# Patient Record
Sex: MALE | Race: WHITE | NOT HISPANIC OR LATINO | Employment: PART TIME | URBAN - METROPOLITAN AREA
[De-identification: names, ages, dates, MRNs, and addresses within clinical notes are randomized per-mention and may not be internally consistent; named-entity substitution may affect disease eponyms.]

---

## 2021-06-10 ENCOUNTER — OFFICE VISIT (OUTPATIENT)
Dept: URGENT CARE | Facility: CLINIC | Age: 21
End: 2021-06-10
Payer: COMMERCIAL

## 2021-06-10 ENCOUNTER — APPOINTMENT (OUTPATIENT)
Dept: RADIOLOGY | Facility: IMAGING CENTER | Age: 21
End: 2021-06-10
Attending: NURSE PRACTITIONER
Payer: COMMERCIAL

## 2021-06-10 VITALS
SYSTOLIC BLOOD PRESSURE: 102 MMHG | HEIGHT: 66 IN | DIASTOLIC BLOOD PRESSURE: 70 MMHG | OXYGEN SATURATION: 95 % | BODY MASS INDEX: 23.75 KG/M2 | HEART RATE: 68 BPM | TEMPERATURE: 99.2 F | RESPIRATION RATE: 16 BRPM | WEIGHT: 147.8 LBS

## 2021-06-10 DIAGNOSIS — S92.354A CLOSED NONDISPLACED FRACTURE OF FIFTH METATARSAL BONE OF RIGHT FOOT, INITIAL ENCOUNTER: ICD-10-CM

## 2021-06-10 DIAGNOSIS — M79.89 SWELLING OF RIGHT FOOT: ICD-10-CM

## 2021-06-10 DIAGNOSIS — M79.671 RIGHT FOOT PAIN: ICD-10-CM

## 2021-06-10 PROCEDURE — 99203 OFFICE O/P NEW LOW 30 MIN: CPT | Performed by: NURSE PRACTITIONER

## 2021-06-10 PROCEDURE — 73630 X-RAY EXAM OF FOOT: CPT | Mod: TC,RT | Performed by: NURSE PRACTITIONER

## 2021-06-10 ASSESSMENT — ENCOUNTER SYMPTOMS
PSYCHIATRIC NEGATIVE: 1
RESPIRATORY NEGATIVE: 1
CONSTITUTIONAL NEGATIVE: 1
GASTROINTESTINAL NEGATIVE: 1
NEUROLOGICAL NEGATIVE: 1
CARDIOVASCULAR NEGATIVE: 1
EYES NEGATIVE: 1

## 2021-06-11 NOTE — PROGRESS NOTES
"Subjective:   Robel Correa is a 20 y.o. male who presents for Foot Injury ((R) x3 weeks )       HPI  Pt presents for evaluation of a new problem, reports being in a group of when one of his friends who weighs 250 to 300 pounds lost his balance and has only stepped full force onto his right lateral foot.  Patient states that he had swelling, bruising, and pain at that time.  Has taken Tylenol intermittently with relief.  Patient states that he continues to have significant amount of pain with standing and walking in the area of injury.    Review of Systems   Constitutional: Negative.    HENT: Negative.    Eyes: Negative.    Respiratory: Negative.    Cardiovascular: Negative.    Gastrointestinal: Negative.    Genitourinary: Negative.    Musculoskeletal: Positive for joint pain.   Skin: Negative.    Neurological: Negative.    Psychiatric/Behavioral: Negative.    All other systems reviewed and are negative.      MEDS: No current outpatient medications on file.  ALLERGIES: No Known Allergies    Patient's PMH, SocHx, SurgHx, FamHx, Drug allergies and medications were reviewed.     Objective:   /70 (BP Location: Left arm, Patient Position: Sitting, BP Cuff Size: Adult)   Pulse 68   Temp 37.3 °C (99.2 °F) (Temporal)   Resp 16   Ht 1.676 m (5' 6\")   Wt 67 kg (147 lb 12.8 oz)   SpO2 95%   BMI 23.86 kg/m²     Physical Exam  Vitals and nursing note reviewed.   Constitutional:       General: He is awake.      Appearance: Normal appearance. He is well-developed.   HENT:      Head: Normocephalic and atraumatic.      Right Ear: External ear normal.      Left Ear: External ear normal.      Nose: Nose normal.      Mouth/Throat:      Lips: Pink.      Mouth: Mucous membranes are moist.      Pharynx: Oropharynx is clear.   Eyes:      General: Lids are normal.      Extraocular Movements: Extraocular movements intact.      Conjunctiva/sclera: Conjunctivae normal.   Cardiovascular:      Rate and Rhythm: Normal rate and " regular rhythm.   Pulmonary:      Effort: Pulmonary effort is normal.   Musculoskeletal:      Cervical back: Normal range of motion.      Right foot: Decreased range of motion.        Feet:    Feet:      Comments: +TTP, mild edema, no skin color changes  Skin:     General: Skin is warm and dry.   Neurological:      Mental Status: He is alert and oriented to person, place, and time.   Psychiatric:         Mood and Affect: Mood normal.         Behavior: Behavior normal. Behavior is cooperative.         Thought Content: Thought content normal.         Judgment: Judgment normal.       Narrative & Impression     6/10/2021 6:13 PM     HISTORY/REASON FOR EXAM:  Pain/Deformity Following Trauma  Right foot pain, injury     TECHNIQUE/EXAM DESCRIPTION AND NUMBER OF VIEWS:  3 views of the RIGHT foot.     COMPARISON:  None.     FINDINGS:     There is a fracture of the 5th metatarsal distal diaphysis. There is no significant displacement.     There are no other fracture or abnormality.     Alignment is normal.     No degenerative changes are present.     IMPRESSION:     5th metatarsal distal diaphyseal fracture         Last Resulted: 06/10/21  6:39 PM          Assessment/Plan:   Assessment    1. Closed nondisplaced fracture of fifth metatarsal bone of right foot, initial encounter  - REFERRAL TO SPORTS MEDICINE    2. Right foot pain  - DX-FOOT-COMPLETE 3+ RIGHT; Future  - REFERRAL TO SPORTS MEDICINE    3. Swelling of right foot  - DX-FOOT-COMPLETE 3+ RIGHT; Future  - REFERRAL TO SPORTS MEDICINE    Vital signs stable at today's acute urgent care visit. Reviewed test results that were completed in the clinic.  Patient provided walking boot. Discussed management options (risks, benefits, and alternatives to treatment), to include alternating Tylenol and Advil as needed for pain, rest, ice, and minimize time spent on feet..     Refer the patient to sports medicine for follow-up care.  Return to urgent care with any worsening symptoms  or if there is no improvement in their current condition. Red flags discussed and indications to immediately call 911 or present to the ED.  All questions were encouraged and answered to the patient's satisfaction and understanding, and they agree to the plan of care.     I personally reviewed prior external notes and test results pertinent to today's visit.  I have independently reviewed and interpreted all diagnostics ordered during this urgent care acute visit. Time spent evaluating this patient was a minimum of 30 minutes and includes preparing for visit, counseling/education, exam, evaluation, obtaining history, and ordering lab/test/procedures.      Please note that this dictation was created using voice recognition software. I have made a reasonable attempt to correct obvious errors, but I expect that there are errors of grammar and possibly content that I did not discover before finalizing the note.

## 2021-06-18 ENCOUNTER — OFFICE VISIT (OUTPATIENT)
Dept: MEDICAL GROUP | Facility: CLINIC | Age: 21
End: 2021-06-18
Payer: COMMERCIAL

## 2021-06-18 VITALS
DIASTOLIC BLOOD PRESSURE: 72 MMHG | WEIGHT: 147.8 LBS | HEART RATE: 66 BPM | SYSTOLIC BLOOD PRESSURE: 114 MMHG | RESPIRATION RATE: 16 BRPM | OXYGEN SATURATION: 97 % | TEMPERATURE: 98.5 F | BODY MASS INDEX: 23.75 KG/M2 | HEIGHT: 66 IN

## 2021-06-18 DIAGNOSIS — S92.351A CLOSED FRACTURE OF FIFTH METATARSAL BONE OF RIGHT FOOT, INITIAL ENCOUNTER: ICD-10-CM

## 2021-06-18 PROCEDURE — 99203 OFFICE O/P NEW LOW 30 MIN: CPT | Performed by: FAMILY MEDICINE

## 2021-06-18 ASSESSMENT — ENCOUNTER SYMPTOMS
CHILLS: 0
DIZZINESS: 0
VOMITING: 0
NAUSEA: 0
SHORTNESS OF BREATH: 0
FEVER: 0

## 2021-06-18 NOTE — PROGRESS NOTES
"Subjective:      Robel Correa is a 21 y.o. male who presents with Foot Injury (Referral from / R foot injury )     Referred by Bee Benoit PA-C  for evaluation of RIGHT foot pain    HPI   RIGHT foot pain  Date of injury, May 20, 2021  Mechanism injury, hugging a friend goodbye, his friend lost his balance and stepped on his foot inadvertently  Causing sudden sharp pain  It is predominate the RIGHT fifth metatarsal region along the shaft and distal fifth metatarsal  Pressure type pain with intermittent sharp pain  Feeling better in CAM Walker boot  Worse with misstep or walking on the lateral aspect of the foot  No radiation  POSITIVE prior history of ankle fracture and tibia/fibula fracture during childhood  Currently taking medication for pain  Initially was having night symptoms which has resolved  Seen at urgent care, had x-rays, determined to have 1/5 metatarsal fracture, placed in a short cam walker boot and referred for further evaluation management    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   Respiratory: Negative for shortness of breath.    Cardiovascular: Negative for chest pain.   Gastrointestinal: Negative for nausea and vomiting.   Neurological: Negative for dizziness.     PMH:  has no past medical history on file.  MEDS: No current outpatient medications on file.  ALLERGIES: No Known Allergies  SURGHX: History reviewed. No pertinent surgical history.  SOCHX:  reports that he has never smoked. He has never used smokeless tobacco. He reports current alcohol use. He reports that he does not use drugs.  FH: Family history was reviewed, no pertinent findings to report     Objective:     /72 (BP Location: Left arm, Patient Position: Sitting, BP Cuff Size: Adult)   Pulse 66   Temp 36.9 °C (98.5 °F) (Temporal)   Resp 16   Ht 1.676 m (5' 6\")   Wt 67 kg (147 lb 12.8 oz)   SpO2 " 97%   BMI 23.86 kg/m²      Physical Exam       RIGHT ANKLE:  There is NO swelling noted at the ankle  Range of motion intact with dorsiflexion and plantarflexion, inversion and eversion  There is NO tenderness of the ATFL, CF or PTF ligament  There is NO tenderness of the lateral malleolus or medial malleolus  Anterior drawer testing is NEGATIVE  Talar tilt testing is NEGATIVE  The foot and ankle is otherwise neurovascularly intact  Excellent strength with resisted eversion    RIGHT FOOT:  There is NO swelling noted at the foot  There is NO tenderness at the base of the fifth metatarsal, cuboid, or tarsal navicular  There is POSITIVE mild tenderness along the fifth metatarsals shaft and distal fifth metatarsal  There is NO pain with metatarsal squeeze test    LEFT ANKLE:  There is NO swelling noted at the ankle  Range of motion intact with dorsiflexion and plantarflexion, inversion and eversion  There is NO tenderness of the ATFL, CF or PTF ligament  There is NO tenderness of the lateral malleolus or medial malleolus  Anterior drawer testing is NEGATIVE  Talar tilt testing is NEGATIVE  The foot and ankle is otherwise neurovascularly intact    LEFT FOOT:  There is NO swelling noted at the foot  There is NO tenderness at the base of the fifth metatarsal, cuboid, or tarsal navicular  There is NO pain with metatarsal squeeze test    NEUTRAL stance  Able to ambulate with near normal gait in short cam walker boot       Assessment/Plan:        1. Closed fracture of fifth metatarsal bone of right foot, initial encounter       Date of injury, May 20, 2021  Mechanism injury, hugging a friend goodbye, his friend lost his balance and stepped on his foot inadvertently  Causing sudden sharp pain    Patient did not present to urgent care until a few weeks after the initial injury  Stable fracture caused by crushing (inadvertently stepped on by a heavy friend)  Fortunately, he is not experiencing any pain in the cam walker boot  unless he has been up on it all day while working    He looks great on physical exam today    The plan is to continue fracture immobilization for a total of 6 weeks from the date of injury (until on or after July 1, 2021)    Return in about 20 days (around 7/8/2021).  We will see him the following week of July 8 to make sure that he is coming along okay out of the boot        6/10/2021 6:13 PM     HISTORY/REASON FOR EXAM:  Pain/Deformity Following Trauma  Right foot pain, injury     TECHNIQUE/EXAM DESCRIPTION AND NUMBER OF VIEWS:  3 views of the RIGHT foot.     COMPARISON:  None.     FINDINGS:     There is a fracture of the 5th metatarsal distal diaphysis. There is no significant displacement.     There are no other fracture or abnormality.     Alignment is normal.     No degenerative changes are present.     IMPRESSION:     5th metatarsal distal diaphyseal fracture    Interpreted in the office today with the patient    Thank you Bee Benoit PA-C for allowing me to participate in caring for your patient.

## 2021-06-18 NOTE — Clinical Note
Ángel Gamboa,  Thank you for referring Robel to our sports medicine clinic.  He is definitely a tough kid presenting as late as he did after his initial injury.  Fortunately, he is doing well.  We will plan on having him in the boot until July 1 and we will see him back after he comes out of the boot to see how he is transitioning.  Thanks!  L

## 2021-07-08 ENCOUNTER — OFFICE VISIT (OUTPATIENT)
Dept: MEDICAL GROUP | Facility: CLINIC | Age: 21
End: 2021-07-08
Payer: COMMERCIAL

## 2021-07-08 VITALS
HEIGHT: 66 IN | DIASTOLIC BLOOD PRESSURE: 70 MMHG | RESPIRATION RATE: 14 BRPM | OXYGEN SATURATION: 97 % | BODY MASS INDEX: 23.75 KG/M2 | WEIGHT: 147.8 LBS | HEART RATE: 63 BPM | TEMPERATURE: 99 F | SYSTOLIC BLOOD PRESSURE: 108 MMHG

## 2021-07-08 DIAGNOSIS — S92.351D CLOSED FRACTURE OF FIFTH METATARSAL BONE OF RIGHT FOOT WITH ROUTINE HEALING, SUBSEQUENT ENCOUNTER: ICD-10-CM

## 2021-07-08 PROCEDURE — 99213 OFFICE O/P EST LOW 20 MIN: CPT | Performed by: FAMILY MEDICINE

## 2021-07-08 NOTE — PROGRESS NOTES
"Subjective:      Robel Correa is a 21 y.o. 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[employer], walking for entire shift  Likes walking his dog and attend the gym     Objective:     /70 (BP Location: Right arm, Patient Position: Sitting, BP Cuff Size: Adult)   Pulse 63   Temp 37.2 °C (99 °F) (Temporal)   Resp 14   Ht 1.676 m (5' 6\")   Wt 67 kg (147 lb 12.8 oz)   SpO2 97%   BMI 23.86 kg/m²     Physical Exam      RIGHT FOOT:  There is NO swelling noted at the foot  There is NO tenderness at the base of the fifth metatarsal, cuboid, or tarsal navicular  There is NO tenderness along the fifth metatarsals shaft or distal fifth metatarsal  There is NO pain with metatarsal squeeze test    NEUTRAL stance  Able to ambulate with near normal gait      Assessment/Plan:     1. Closed fracture of fifth metatarsal bone of right foot with routine healing, subsequent encounter       Date of injury, May 20, 2021  Mechanism injury, hugging a friend goodbye, his friend lost his balance and stepped on his foot inadvertently  Causing sudden sharp pain    Intermittent pain with certain sudden movements particularly resisted eversion    Patient did not present to urgent care until a few weeks after the initial injury  Stable fracture caused by crushing (inadvertently stepped on by a heavy friend)  Fortunately, he is not experiencing any pain in the cam walker boot unless he has been up on it all day " while working    Clinically healed/healing  Follow-up as needed        6/10/2021 6:13 PM     HISTORY/REASON FOR EXAM:  Pain/Deformity Following Trauma  Right foot pain, injury     TECHNIQUE/EXAM DESCRIPTION AND NUMBER OF VIEWS:  3 views of the RIGHT foot.     COMPARISON:  None.     FINDINGS:     There is a fracture of the 5th metatarsal distal diaphysis. There is no significant displacement.     There are no other fracture or abnormality.     Alignment is normal.     No degenerative changes are present.     IMPRESSION:     5th metatarsal distal diaphyseal fracture    Thank you Bee Benoit PA-C for allowing me to participate in caring for your patient.

## 2022-02-01 ENCOUNTER — TELEPHONE (OUTPATIENT)
Dept: SCHEDULING | Facility: IMAGING CENTER | Age: 22
End: 2022-02-01

## 2022-02-03 ENCOUNTER — OFFICE VISIT (OUTPATIENT)
Dept: MEDICAL GROUP | Facility: MEDICAL CENTER | Age: 22
End: 2022-02-03
Payer: COMMERCIAL

## 2022-02-03 ENCOUNTER — HOSPITAL ENCOUNTER (OUTPATIENT)
Dept: LAB | Facility: MEDICAL CENTER | Age: 22
End: 2022-02-03
Attending: FAMILY MEDICINE
Payer: COMMERCIAL

## 2022-02-03 ENCOUNTER — HOSPITAL ENCOUNTER (OUTPATIENT)
Facility: MEDICAL CENTER | Age: 22
End: 2022-02-03
Attending: FAMILY MEDICINE
Payer: COMMERCIAL

## 2022-02-03 VITALS
BODY MASS INDEX: 24.72 KG/M2 | OXYGEN SATURATION: 94 % | SYSTOLIC BLOOD PRESSURE: 116 MMHG | HEART RATE: 63 BPM | HEIGHT: 66 IN | TEMPERATURE: 97.8 F | DIASTOLIC BLOOD PRESSURE: 80 MMHG | WEIGHT: 153.8 LBS

## 2022-02-03 DIAGNOSIS — Z11.3 ROUTINE SCREENING FOR STI (SEXUALLY TRANSMITTED INFECTION): ICD-10-CM

## 2022-02-03 DIAGNOSIS — T78.40XA ALLERGY, INITIAL ENCOUNTER: ICD-10-CM

## 2022-02-03 DIAGNOSIS — Z00.00 WELLNESS EXAMINATION: Primary | ICD-10-CM

## 2022-02-03 DIAGNOSIS — H91.93 DECREASED HEARING OF BOTH EARS: ICD-10-CM

## 2022-02-03 DIAGNOSIS — Z23 NEED FOR VACCINATION: ICD-10-CM

## 2022-02-03 DIAGNOSIS — Z00.00 WELLNESS EXAMINATION: ICD-10-CM

## 2022-02-03 LAB
25(OH)D3 SERPL-MCNC: 20 NG/ML (ref 30–100)
ALBUMIN SERPL BCP-MCNC: 4.9 G/DL (ref 3.2–4.9)
ALBUMIN/GLOB SERPL: 1.6 G/DL
ALP SERPL-CCNC: 59 U/L (ref 30–99)
ALT SERPL-CCNC: 18 U/L (ref 2–50)
AMBIGUOUS DTTM AMBI4: NORMAL
ANION GAP SERPL CALC-SCNC: 10 MMOL/L (ref 7–16)
AST SERPL-CCNC: 17 U/L (ref 12–45)
BILIRUB SERPL-MCNC: 1 MG/DL (ref 0.1–1.5)
BUN SERPL-MCNC: 11 MG/DL (ref 8–22)
CALCIUM SERPL-MCNC: 9.9 MG/DL (ref 8.5–10.5)
CHLORIDE SERPL-SCNC: 102 MMOL/L (ref 96–112)
CO2 SERPL-SCNC: 27 MMOL/L (ref 20–33)
CREAT SERPL-MCNC: 0.9 MG/DL (ref 0.5–1.4)
ERYTHROCYTE [DISTWIDTH] IN BLOOD BY AUTOMATED COUNT: 38.7 FL (ref 35.9–50)
GLOBULIN SER CALC-MCNC: 3 G/DL (ref 1.9–3.5)
GLUCOSE SERPL-MCNC: 90 MG/DL (ref 65–99)
HBV SURFACE AG SER QL: NORMAL
HCT VFR BLD AUTO: 50.4 % (ref 42–52)
HCV AB SER QL: NORMAL
HGB BLD-MCNC: 17 G/DL (ref 14–18)
HIV 1+2 AB+HIV1 P24 AG SERPL QL IA: NORMAL
MCH RBC QN AUTO: 30.4 PG (ref 27–33)
MCHC RBC AUTO-ENTMCNC: 33.7 G/DL (ref 33.7–35.3)
MCV RBC AUTO: 90.2 FL (ref 81.4–97.8)
PLATELET # BLD AUTO: 238 K/UL (ref 164–446)
PMV BLD AUTO: 10.1 FL (ref 9–12.9)
POTASSIUM SERPL-SCNC: 4.7 MMOL/L (ref 3.6–5.5)
PROT SERPL-MCNC: 7.9 G/DL (ref 6–8.2)
RBC # BLD AUTO: 5.59 M/UL (ref 4.7–6.1)
SODIUM SERPL-SCNC: 139 MMOL/L (ref 135–145)
TREPONEMA PALLIDUM IGG+IGM AB [PRESENCE] IN SERUM OR PLASMA BY IMMUNOASSAY: NORMAL
WBC # BLD AUTO: 5.8 K/UL (ref 4.8–10.8)

## 2022-02-03 PROCEDURE — 86780 TREPONEMA PALLIDUM: CPT

## 2022-02-03 PROCEDURE — 87340 HEPATITIS B SURFACE AG IA: CPT

## 2022-02-03 PROCEDURE — 87591 N.GONORRHOEAE DNA AMP PROB: CPT

## 2022-02-03 PROCEDURE — 80053 COMPREHEN METABOLIC PANEL: CPT

## 2022-02-03 PROCEDURE — 87491 CHLMYD TRACH DNA AMP PROBE: CPT

## 2022-02-03 PROCEDURE — 90471 IMMUNIZATION ADMIN: CPT | Performed by: FAMILY MEDICINE

## 2022-02-03 PROCEDURE — 85027 COMPLETE CBC AUTOMATED: CPT

## 2022-02-03 PROCEDURE — 36415 COLL VENOUS BLD VENIPUNCTURE: CPT

## 2022-02-03 PROCEDURE — 99395 PREV VISIT EST AGE 18-39: CPT | Mod: 25 | Performed by: FAMILY MEDICINE

## 2022-02-03 PROCEDURE — 90715 TDAP VACCINE 7 YRS/> IM: CPT | Performed by: FAMILY MEDICINE

## 2022-02-03 PROCEDURE — 82306 VITAMIN D 25 HYDROXY: CPT

## 2022-02-03 PROCEDURE — 87389 HIV-1 AG W/HIV-1&-2 AB AG IA: CPT

## 2022-02-03 PROCEDURE — 86803 HEPATITIS C AB TEST: CPT

## 2022-02-03 ASSESSMENT — ENCOUNTER SYMPTOMS
DEPRESSION: 0
SHORTNESS OF BREATH: 0
BLOOD IN STOOL: 0
NERVOUS/ANXIOUS: 0

## 2022-02-03 ASSESSMENT — PATIENT HEALTH QUESTIONNAIRE - PHQ9: CLINICAL INTERPRETATION OF PHQ2 SCORE: 0

## 2022-02-03 NOTE — ASSESSMENT & PLAN NOTE
Patient not have any concerning signs for anaphylaxis at this time.  He would like to get checked to see if he has true allergies.  Referral to allergist placed

## 2022-02-03 NOTE — ASSESSMENT & PLAN NOTE
Healthy 21-year-old male  He would like to have his vitamin D checked  We will also check a chemistry  He has some good lifestyle habits which we discussed to reinforce.  I encouraged him to work out more regularly and to take a look at the Mediterranean diet to improve his food choices.  Good request to previous PCP for updated vaccinations  Plan to see patient annually unless something comes up

## 2022-02-03 NOTE — LETTER
[employer]  Jose Kemp D.O.  [address]  [place] [address]  Fax: [phone]   Authorization for Release/Disclosure of   Protected Health Information   Name: ROMINA GREEN : 2000 SSN: [id number]   Address: [address] [place] [address]  [address]  [place] [address] Phone:    [phone] (home)    I authorize the entity listed below to release/disclose the PHI below to:   [employer]/Jose Kemp D.O. and Jose Kemp D.O.   Provider or Entity Name:  Dr. Garza   [facility], [facility]  [address] [place] [address] Phone:  [phone]    Fax:     Reason for request: continuity of care   Information to be released:    [  ] LAST COLONOSCOPY,  including any PATH REPORT and follow-up  [  ] LAST FIT/COLOGUARD RESULT [  ] LAST DEXA  [  ] LAST MAMMOGRAM  [  ] LAST PAP  [  ] LAST LABS [  ] RETINA EXAM REPORT  [xxx  ] IMMUNIZATION RECORDS  [  ] Release all info      [  ] Check here and initial the line next to each item to release ALL health information INCLUDING  _____ Care and treatment for drug and / or alcohol abuse  _____ HIV testing, infection status, or AIDS  _____ Genetic Testing    DATES OF SERVICE OR TIME PERIOD TO BE DISCLOSED: _____________  I understand and acknowledge that:  * This Authorization may be revoked at any time by you in writing, except if your health information has already been used or disclosed.  * Your health information that will be used or disclosed as a result of you signing this authorization could be re-disclosed by the recipient. If this occurs, your re-disclosed health information may no longer be protected by State or Federal laws.  * You may refuse to sign this Authorization. Your refusal will not affect your ability to obtain treatment.  * This Authorization becomes effective upon signing and will  on (date) __________.      If no date is indicated, this Authorization will  one (1) year from the signature date.    Name: Romina  Sunny    Signature:   Date:     2/3/2022       PLEASE FAX REQUESTED RECORDS BACK TO: [phone]

## 2022-02-03 NOTE — PROGRESS NOTES
Subjective:     CC:  The primary encounter diagnosis was Wellness examination. Diagnoses of Routine screening for STI (sexually transmitted infection), Allergy, initial encounter, Decreased hearing of both ears, and Need for vaccination were also pertinent to this visit.    HISTORY OF THE PRESENT ILLNESS: Patient is a 21 y.o. male. This pleasant patient is here today to establish care and discuss well visit, concern for allergies, decreased hearing.     Problem   Allergies    Apples and Hazelnuts seem to cause itchiness in his mouth.  No hives, no difficulty breathing  Tends to go away in about 1 hour  Has never had allergy testing before  No history of eczema or asthma     Decreased Hearing of Both Ears    Onset 2020  Feels like it has worsened since  Hasn't had notable sound insults  No discharge from ears  Sometimes there is some ringing in his ears  Hasn't noted any pattern to this     Wellness Examination    21-year-old male who is otherwise healthy.  Occasionally exercises, once a week some cardio and weight lifting  Diet: a lot of processed foods, pastas, good with veggies but fruit poor  Works at [employer]  He is in a 4-year relationship with his girlfriend, she has an IUD  He does not smoke  He is a rare drinker  He does not use drugs  He believes his vaccines are up-to-date though he does not have this years flu shot  He has no significant family history for illness or early cardiac death     Routine Screening for Sti (Sexually Transmitted Infection)    Patient still heterosexual relationship.  Discussed with him that guidelines for his age group to recommend having at least a one-time STI screening, patient is amenable to this.  He is not currently having symptoms.         No current [facility]-ordered outpatient medications on file.     No current Epic-ordered facility-administered medications on file.       Health Maintenance: Samra importance of annual flu shot, requesting records previous PCP for  "vaccines    ROS:   Review of Systems   Constitutional: Negative for malaise/fatigue.   HENT: Positive for hearing loss and tinnitus. Negative for ear discharge and ear pain.    Respiratory: Negative for shortness of breath.    Cardiovascular: Negative for chest pain.   Gastrointestinal: Negative for blood in stool.   Genitourinary: Negative for dysuria.   Psychiatric/Behavioral: Negative for depression. The patient is not nervous/anxious.          Objective:     Exam: /80   Pulse 63   Temp 36.6 °C (97.8 °F) (Temporal)   Ht 1.676 m (5' 6\")   Wt 69.8 kg (153 lb 12.8 oz)   SpO2 94%  Body mass index is 24.82 kg/m².    Physical Exam  Vitals reviewed.   Constitutional:       Appearance: Normal appearance. He is normal weight.   HENT:      Head: Normocephalic and atraumatic.   Eyes:      Extraocular Movements: Extraocular movements intact.   Cardiovascular:      Rate and Rhythm: Normal rate and regular rhythm.      Heart sounds: Normal heart sounds.   Pulmonary:      Effort: Pulmonary effort is normal.      Breath sounds: Normal breath sounds.   Neurological:      Mental Status: He is alert. Mental status is at baseline.   Psychiatric:         Mood and Affect: Mood normal.         Behavior: Behavior normal.           Assessment & Plan:   21 y.o. male with the following -    Problem List Items Addressed This Visit     Allergies     Patient not have any concerning signs for anaphylaxis at this time.  He would like to get checked to see if he has true allergies.  Referral to allergist placed         Relevant Orders    Referral to Allergy    Decreased hearing of both ears     TMs intact is external canal is little irritated.  Counseled on not using Q-tips but instead using flushes to clean her ear wax.  Patient declines audiology at this time         Wellness examination - Primary     Healthy 21-year-old male  He would like to have his vitamin D checked  We will also check a chemistry  He has some good lifestyle " habits which we discussed to reinforce.  I encouraged him to work out more regularly and to take a look at the Mediterranean diet to improve his food choices.  Good request to previous PCP for updated vaccinations  Plan to see patient annually unless something comes up         Relevant Orders    CBC WITHOUT DIFFERENTIAL    Comp Metabolic Panel    VITAMIN D,25 HYDROXY    Routine screening for STI (sexually transmitted infection)     STI screening labs placed         Relevant Orders    HCV Scrn ( 6542-3108 [employer])    HIV AG/AB COMBO ASSAY SCREENING    T.PALLIDUM AB EIA    Chlamydia/GC PCR Urine Or Swab    HEP B SURFACE ANTIGEN      Other Visit Diagnoses     Need for vaccination        Relevant Orders    Tdap =>8yo IM (Completed)            Return in about 1 year (around 2/3/2023).    Please note that this dictation was created using voice recognition software. I have made every reasonable attempt to correct obvious errors, but I expect that there are errors of grammar and possibly content that I did not discover before finalizing the note.

## 2022-02-04 LAB
C TRACH DNA SPEC QL NAA+PROBE: NEGATIVE
N GONORRHOEA DNA SPEC QL NAA+PROBE: NEGATIVE
SPECIMEN SOURCE: NORMAL

## 2022-03-29 ENCOUNTER — HOSPITAL ENCOUNTER (OUTPATIENT)
Dept: LAB | Facility: MEDICAL CENTER | Age: 22
End: 2022-03-29
Attending: INTERNAL MEDICINE
Payer: COMMERCIAL

## 2022-03-29 PROCEDURE — 82785 ASSAY OF IGE: CPT

## 2022-03-29 PROCEDURE — 86003 ALLG SPEC IGE CRUDE XTRC EA: CPT | Mod: 91

## 2022-03-29 PROCEDURE — 36415 COLL VENOUS BLD VENIPUNCTURE: CPT

## 2022-03-30 ENCOUNTER — OFFICE VISIT (OUTPATIENT)
Dept: MEDICAL GROUP | Facility: PHYSICIAN GROUP | Age: 22
End: 2022-03-30
Payer: COMMERCIAL

## 2022-03-30 DIAGNOSIS — Z11.1 ENCOUNTER FOR PPD TEST: ICD-10-CM

## 2022-03-30 PROCEDURE — 86580 TB INTRADERMAL TEST: CPT | Performed by: INTERNAL MEDICINE

## 2022-03-30 NOTE — PROGRESS NOTES
Robel Correa is a 21 y.o. male here for a non-provider visit for PPD placement -- Step 1 of 1    Reason for PPD:  work requirement    1. TB evaluation questionnaire completed by patient? Yes      -  If any answers marked yes did you contact a provider prior to placing? Not Indicated  2.  Patient notified to return to clinic for reading on: 4/1/22 or 4/2/22  3.  PPD Placement documentation completed on TB evaluation questionnaire? Yes  4.  Location of TB evaluation questionnaire filed: [place]

## 2022-03-31 LAB — IGE SERPL-ACNC: 800 KU/L

## 2022-04-02 ENCOUNTER — NON-PROVIDER VISIT (OUTPATIENT)
Dept: URGENT CARE | Facility: PHYSICIAN GROUP | Age: 22
End: 2022-04-02

## 2022-04-02 LAB — TB WHEAL 3D P 5 TU DIAM: 0 MM

## 2022-04-02 NOTE — PROGRESS NOTES
Robel Correa is a 21 y.o. male here for a non-provider visit for PPD reading -- Step 1 of 1.      1.  Resulted in Epic under enter/edit results? Yes   2.  TB evaluation questionnaire scanned into chart and original given to patient?Yes      3. Was induration greater than 0 mm? No.        Routed to PCP? No

## 2022-04-04 LAB — TEST NAME 95000: NORMAL

## 2022-06-27 ENCOUNTER — HOSPITAL ENCOUNTER (EMERGENCY)
Facility: MEDICAL CENTER | Age: 22
End: 2022-06-27
Attending: EMERGENCY MEDICINE

## 2022-06-27 VITALS
OXYGEN SATURATION: 97 % | HEART RATE: 72 BPM | WEIGHT: 162.7 LBS | RESPIRATION RATE: 18 BRPM | DIASTOLIC BLOOD PRESSURE: 89 MMHG | HEIGHT: 66 IN | TEMPERATURE: 98.6 F | SYSTOLIC BLOOD PRESSURE: 135 MMHG | BODY MASS INDEX: 26.15 KG/M2

## 2022-06-27 DIAGNOSIS — S61.213A LACERATION OF LEFT MIDDLE FINGER WITHOUT FOREIGN BODY WITHOUT DAMAGE TO NAIL, INITIAL ENCOUNTER: ICD-10-CM

## 2022-06-27 PROCEDURE — 90471 IMMUNIZATION ADMIN: CPT

## 2022-06-27 PROCEDURE — 90715 TDAP VACCINE 7 YRS/> IM: CPT | Performed by: EMERGENCY MEDICINE

## 2022-06-27 PROCEDURE — 304999 HCHG REPAIR-SIMPLE/INTERMED LEVEL 1

## 2022-06-27 PROCEDURE — 99282 EMERGENCY DEPT VISIT SF MDM: CPT

## 2022-06-27 PROCEDURE — 304217 HCHG IRRIGATION SYSTEM

## 2022-06-27 PROCEDURE — 303353 HCHG DERMABOND SKIN ADHESIVE

## 2022-06-27 PROCEDURE — 700111 HCHG RX REV CODE 636 W/ 250 OVERRIDE (IP): Performed by: EMERGENCY MEDICINE

## 2022-06-27 RX ADMIN — CLOSTRIDIUM TETANI TOXOID ANTIGEN (FORMALDEHYDE INACTIVATED), CORYNEBACTERIUM DIPHTHERIAE TOXOID ANTIGEN (FORMALDEHYDE INACTIVATED), BORDETELLA PERTUSSIS TOXOID ANTIGEN (GLUTARALDEHYDE INACTIVATED), BORDETELLA PERTUSSIS FILAMENTOUS HEMAGGLUTININ ANTIGEN (FORMALDEHYDE INACTIVATED), BORDETELLA PERTUSSIS PERTACTIN ANTIGEN, AND BORDETELLA PERTUSSIS FIMBRIAE 2/3 ANTIGEN 0.5 ML: 5; 2; 2.5; 5; 3; 5 INJECTION, SUSPENSION INTRAMUSCULAR at 21:50

## 2022-06-27 ASSESSMENT — FIBROSIS 4 INDEX: FIB4 SCORE: .4040610178208842996

## 2022-06-28 NOTE — ED NOTES
".  Chief Complaint   Patient presents with   • Laceration     Left middle finger laceration from a can of dog food, bleeding controlled, up to date on tetanus     .BP (!) 152/82   Pulse 73   Temp 37.2 °C (98.9 °F) (Temporal)   Resp 14   Ht 1.676 m (5' 6\")   Wt 73.8 kg (162 lb 11.2 oz)   SpO2 96%   BMI 26.26 kg/m²       "

## 2022-06-28 NOTE — ED PROVIDER NOTES
"ED Provider Note    CHIEF COMPLAINT  Chief Complaint   Patient presents with   • Laceration     Left middle finger laceration from a can of dog food, bleeding controlled, up to date on tetanus       HPI  Robel Correa is a 22 y.o. male who presents with a laceration to the left third phalanx.  The patient states that he was opening a can of dog food when he intermittently cut himself between the PIP and DIP joint of the left third phalanx.  The patient has no loss of function.  He has localized discomfort in this distribution.  He is unaware if his tetanus is up-to-date.    REVIEW OF SYSTEMS  No other musculoskeletal complaints, no recent fevers    PHYSICAL EXAM  VITAL SIGNS: BP (!) 152/82   Pulse 73   Temp 37.2 °C (98.9 °F) (Temporal)   Resp 14   Ht 1.676 m (5' 6\")   Wt 73.8 kg (162 lb 11.2 oz)   SpO2 96%   BMI 26.26 kg/m²   In general the patient does not appear toxic    Extremities the patient has a 0.5 cm laceration on the radial aspect of the left third mid phalanx.  There is no skeletal deformities.  He has full flexion and extension at the MCP and IP joints.    Skin at 0.5 cm laceration described above    Neurovascular examination is grossly intact to the left hand      Procedure finger laceration repair  The wound was irrigated with saline.  Subsequently utilize Dermabond for primary closure as the laceration is very small and not located over the joint.  COURSE & MEDICAL DECISION MAKING  Pertinent Labs & Imaging studies reviewed. (See chart for details)  This a 22-year-old male who presents to the emergency department with a finger laceration.  Primary closure was performed with Dermabond.  The patient will receive tetanus prophylaxis.  The patient will return to the emergency department for signs of infection.    FINAL IMPRESSION  1.  0.5 cm left third phalanx laceration    Disposition  The patient will be discharged in stable condition      Electronically signed by: Fortunato Umana M.D., " 6/27/2022 9:19 PM